# Patient Record
Sex: FEMALE | Race: WHITE | NOT HISPANIC OR LATINO | Employment: UNEMPLOYED | URBAN - METROPOLITAN AREA
[De-identification: names, ages, dates, MRNs, and addresses within clinical notes are randomized per-mention and may not be internally consistent; named-entity substitution may affect disease eponyms.]

---

## 2018-08-13 ENCOUNTER — TELEPHONE (OUTPATIENT)
Dept: ORTHOPEDIC SURGERY | Facility: CLINIC | Age: 83
End: 2018-08-13

## 2018-08-13 ENCOUNTER — OFFICE VISIT (OUTPATIENT)
Dept: ORTHOPEDIC SURGERY | Facility: CLINIC | Age: 83
End: 2018-08-13

## 2018-08-13 VITALS — TEMPERATURE: 98.9 F | HEIGHT: 61 IN | WEIGHT: 126 LBS | BODY MASS INDEX: 23.79 KG/M2

## 2018-08-13 DIAGNOSIS — S93.401A SPRAIN OF RIGHT ANKLE, UNSPECIFIED LIGAMENT, INITIAL ENCOUNTER: ICD-10-CM

## 2018-08-13 DIAGNOSIS — S99.911A ANKLE INJURY, RIGHT, INITIAL ENCOUNTER: Primary | ICD-10-CM

## 2018-08-13 DIAGNOSIS — S99.912A ANKLE INJURY, LEFT, INITIAL ENCOUNTER: ICD-10-CM

## 2018-08-13 PROCEDURE — 73610 X-RAY EXAM OF ANKLE: CPT | Performed by: ORTHOPAEDIC SURGERY

## 2018-08-13 PROCEDURE — 99203 OFFICE O/P NEW LOW 30 MIN: CPT | Performed by: ORTHOPAEDIC SURGERY

## 2018-08-13 RX ORDER — ASCORBIC ACID 500 MG
500 TABLET ORAL DAILY
COMMUNITY

## 2018-08-13 RX ORDER — CELECOXIB 200 MG/1
200 CAPSULE ORAL DAILY
COMMUNITY

## 2018-08-14 NOTE — PROGRESS NOTES
"   New Patient Complaint      Patient: Rosalind Phillips  YOB: 1932 85 y.o. female  Medical Record Number: [id number]    Chief Complaints: I hurt my ankle    History of Present Illness:   Patient sustained an injury to her right ankle this morning when she tripped.  She initially had significant pain and swelling to the right ankle and wrapped it.  Her  who is retired physician called and I worked her in today.  She states it is feeling much better with only slight achiness over the anterolateral aspect of the right ankle with some swelling worse with walking and improved with ice and        HPI    Allergies:   Allergies   Allergen Reactions   • Penicillins Hives       Medications:   No current outpatient prescriptions on file prior to visit.     No current facility-administered medications on file prior to visit.        No past medical history on file.  No past surgical history on file.  Social History     Occupational History   • Not on file.     Social History Main Topics   • Smoking status: Never Smoker   • Smokeless tobacco: Never Used   • Alcohol use No   • Drug use: Unknown   • Sexual activity: Defer      Social History     Social History Narrative   • No narrative on file     No family history on file.    Review of Systems: 14 point review of systems performed, positive pertinent findings identified in HPI. All remaining systems negativeExcept rash     Review of Systems      Physical Exam:   Vitals:    08/13/18 1548   Temp: 98.9 °F (37.2 °C)   Weight: 57.2 kg (126 lb)   Height: 154.9 cm (61\")   PainSc:   5     Physical Exam   Constitutional: pleasant, well developed  she is with her granddaughter   Eyes: sclera non icteric  Hearing : adequate for exam  Cardiovascular: palpable pulses in right foot, right calf/ thigh NT without sign of DVT  Respiratoy: breathing unlabored   Neurological: grossly sensate to LT throughout right LE  Psychiatric: oriented with normal mood and affect. "   Lymphatic: No palpable popliteal lymphadenopathy right LE  Skin: intact throughout right leg/foot  Musculoskeletal: On exam she is in fashionable shoes.  Right ankle shows slight swelling over the anterolateral ligamentous structures with slight tenderness to palpation there but no gross instability on talar tilt and anterior drawer.  She was nontender along the fibula nor peroneal tendons.  No pain on the syndesmosis to palpation nor with proximal fibular compression or external rotation testing.  Nontender over the medial ankle and nontender over the dorsum of the midfoot or fifth metatarsal.  Physical Exam  Ortho Exam    Radiology: 3 views of the right ankle ordered to evaluate pain reviewed and no prior x-rays available for comparison.  I don't see any obvious fracture or malalignment.    Assessment/Plan: 1.  Right ankle sprain without clear evidence of fracture or tendon injury.    Overall she is much better than she was earlier today.  She was fitted with an ASO brace for use for the next 2 weeks and if symptoms have improved she may start weaning out of it.  Also gave her prescription for Voltaren gel to apply the area twice daily.  She's already on Celebrex.    If symptoms persist or worsen she will let me know and we'll get an MRI otherwise I will see her back as needed

## 2021-08-13 ENCOUNTER — HOSPITAL ENCOUNTER (OUTPATIENT)
Dept: GENERAL RADIOLOGY | Facility: HOSPITAL | Age: 86
Discharge: HOME | End: 2021-08-13
Admitting: SURGERY

## 2021-08-13 ENCOUNTER — TRANSCRIBE ORDERS (OUTPATIENT)
Dept: ADMINISTRATIVE | Facility: HOSPITAL | Age: 86
End: 2021-08-13
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
## 2021-08-13 PROCEDURE — 73060 X-RAY EXAM OF HUMERUS: CPT

## 2021-10-27 ENCOUNTER — OFFICE VISIT (OUTPATIENT)
Dept: FAMILY MEDICINE CLINIC | Facility: CLINIC | Age: 86
End: 2021-10-27

## 2021-10-27 VITALS
BODY MASS INDEX: 23.03 KG/M2 | DIASTOLIC BLOOD PRESSURE: 70 MMHG | HEART RATE: 78 BPM | OXYGEN SATURATION: 97 % | SYSTOLIC BLOOD PRESSURE: 124 MMHG | RESPIRATION RATE: 15 BRPM | TEMPERATURE: 96.9 F | WEIGHT: 122 LBS | HEIGHT: 61 IN

## 2021-10-27 DIAGNOSIS — L65.0 TELOGEN EFFLUVIUM: Primary | ICD-10-CM

## 2021-10-27 DIAGNOSIS — G47.62 LEG CRAMPS, SLEEP RELATED: ICD-10-CM

## 2021-10-27 PROCEDURE — 99203 OFFICE O/P NEW LOW 30 MIN: CPT | Performed by: NURSE PRACTITIONER

## 2021-10-27 PROCEDURE — G0008 ADMIN INFLUENZA VIRUS VAC: HCPCS | Performed by: NURSE PRACTITIONER

## 2021-10-27 PROCEDURE — 90662 IIV NO PRSV INCREASED AG IM: CPT | Performed by: NURSE PRACTITIONER

## 2021-10-27 RX ORDER — FOLIC ACID 0.8 MG
TABLET ORAL
COMMUNITY

## 2021-10-27 RX ORDER — SOFT LENS ADJUNCTIVE SOLUTIONS
1 DROPS OPHTHALMIC (EYE)
COMMUNITY

## 2021-10-27 RX ORDER — ZINC SULFATE 50(220)MG
CAPSULE ORAL
COMMUNITY

## 2021-10-27 RX ORDER — LUTEIN 10 MG
TABLET ORAL
COMMUNITY

## 2021-10-27 RX ORDER — PRASTERONE (DHEA) 50 MG
CAPSULE ORAL
COMMUNITY

## 2021-10-27 RX ORDER — MULTIPLE VITAMINS W/ MINERALS TAB 9MG-400MCG
1 TAB ORAL DAILY
COMMUNITY

## 2021-10-27 NOTE — PROGRESS NOTES
"Chief Complaint  Medicare Wellness-subsequent    Subjective          Rosalind Phillips presents to [facility]  History of Present Illness New pt here to [facility] care.  Previous PCP no longer in practice.      Considers herself overall very healthy.  She has had some falls and begun balance exercises and paying more attn and has not had a fall since last in August when she hurt her left shoulder, but no fx.  Has been doing PT and improved.  Takes vitamin D daily for osteopenia.  On celebrex daily for OA and feels like it works well.  Denies side effects.     Hair loss x several months which now seems to be decreasing. This was not normal for her and she noticed thinning of her hair around crown of head.   also noticed and since then she has been taking more supplements for hair loss in addition to biotin she takes.      She also has leg cramps when sleeping off and on for last several years.  They wake her up at night and she has to stand on floor to resolve.  Never been evaluated.  Does not seem to have restless legs at night and has not had cramping in her legs during the day.    She avoids any acidic foods as it aggravates her reflux.  Takes occasional Pepcid to resolve.    Denies other health concerns today.  Objective   Vital Signs:   /70 (BP Location: Right arm, Patient Position: Sitting, Cuff Size: Adult)   Pulse 78   Temp 96.9 °F (36.1 °C) (Temporal)   Resp 15   Ht 154.9 cm (60.98\")   Wt 55.3 kg (122 lb)   SpO2 97%   BMI 23.06 kg/m²     Physical Exam  Vitals and nursing note reviewed.   Constitutional:       General: She is not in acute distress.     Appearance: She is well-developed. She is not ill-appearing or diaphoretic.   HENT:      Head: Normocephalic and atraumatic.   Eyes:      General:         Right eye: No discharge.         Left eye: No discharge.      Conjunctiva/sclera: Conjunctivae normal.   Cardiovascular:      Rate and Rhythm: Normal rate and " regular rhythm.      Heart sounds: Normal heart sounds.   Pulmonary:      Effort: Pulmonary effort is normal.      Breath sounds: Normal breath sounds.   Abdominal:      General: Bowel sounds are normal.      Palpations: Abdomen is soft.      Tenderness: There is no abdominal tenderness.   Musculoskeletal:         General: No deformity.      Comments: Gait smooth and steady   Skin:     General: Skin is warm and dry.      Comments: Patient does appear to have some mild hair loss.  No patchy alopecia, no scarring.  Loss seems to be mostly crown of head.   Neurological:      Mental Status: She is alert and oriented to person, place, and time.        Result Review :                 Assessment and Plan    Diagnoses and all orders for this visit:    1. Telogen effluvium (Primary)  -     Iron Profile; Future  -     Ferritin; Future  -     TSH Rfx On Abnormal To Free T4; Future    2. Leg cramps, sleep related  -     Iron Profile; Future  -     Ferritin; Future    Other orders  -     Cancel: FluLaval/Fluarix/Fluzone >6 Months (6364-3321)  -     Fluzone High-Dose 65+yrs (5227-4188)      Pt will need to be off biotin for at least 72 hrs to get appropriate TSH level.  She will return for labs.      Discussed mgmt of leg cramps:  Will check ferritin and iron to make sure not RLS.      Reviewed last PCP note in June-wellness visit, most recent labs and imaging from recent fall.            Follow Up   Return if symptoms worsen or fail to improve.  Patient was given instructions and counseling regarding her condition or for health maintenance advice. Please see specific information pulled into the AVS if appropriate.

## 2021-11-05 ENCOUNTER — TELEPHONE (OUTPATIENT)
Dept: FAMILY MEDICINE CLINIC | Facility: CLINIC | Age: 86
End: 2021-11-05

## 2021-11-05 NOTE — TELEPHONE ENCOUNTER
Caller: Rosalind Phillips    Relationship: Self    Best call back number:[phone] (H)     What is the best time to reach you: ANYTIME    Who are you requesting to speak with (clinical staff, provider,  specific staff member): CLINICAL STAFF    What was the call regarding: PATIENT RETURNING MARIANN'S CALL REGARDING TEST RESULTS    Do you require a callback: YES

## 2022-08-25 ENCOUNTER — OFFICE VISIT (OUTPATIENT)
Dept: ORTHOPEDIC SURGERY | Facility: CLINIC | Age: 87
End: 2022-08-25

## 2022-08-25 VITALS — HEIGHT: 60 IN | BODY MASS INDEX: 23.56 KG/M2 | TEMPERATURE: 97.3 F | WEIGHT: 120 LBS | RESPIRATION RATE: 18 BRPM

## 2022-08-25 DIAGNOSIS — M25.561 PAIN IN BOTH KNEES, UNSPECIFIED CHRONICITY: ICD-10-CM

## 2022-08-25 DIAGNOSIS — M17.0 BILATERAL PRIMARY OSTEOARTHRITIS OF KNEE: Primary | ICD-10-CM

## 2022-08-25 DIAGNOSIS — R52 PAIN: ICD-10-CM

## 2022-08-25 DIAGNOSIS — M25.562 PAIN IN BOTH KNEES, UNSPECIFIED CHRONICITY: ICD-10-CM

## 2022-08-25 PROCEDURE — 99204 OFFICE O/P NEW MOD 45 MIN: CPT | Performed by: ORTHOPAEDIC SURGERY

## 2022-08-25 PROCEDURE — 73562 X-RAY EXAM OF KNEE 3: CPT | Performed by: ORTHOPAEDIC SURGERY

## 2022-08-25 NOTE — PROGRESS NOTES
"Patient ID: Rosalind Phillips     Chief Complaint:    Chief Complaint   Patient presents with   • Left Knee - Pain, Initial Evaluation   • Right Knee - Pain, Initial Evaluation        HPI:    Rosalind Phillips is a 89 y.o. who presents today for evaluation of bilateral knee pain.  Patient states she has had knee issues for several years currently left is worse than the right.  The increased activity or certain movements she has increased symptoms.  She does take Celebrex and states that it has helped keep symptoms at bay.  She is also feeling some left knee instability at times.  She states she exercises every day for some knee strengthening.  But would like to discuss other treatment options specifically an injection for her symptoms.    Social History     Socioeconomic History   • Marital status:    Tobacco Use   • Smoking status: Never Smoker   • Smokeless tobacco: Never Used   Vaping Use   • Vaping Use: Never used   Substance and Sexual Activity   • Alcohol use: No   • Sexual activity: Defer     History reviewed. No pertinent past medical history.  History reviewed. No pertinent family history.    ROS:    ROS:  Constitutional:  Denies fever, shaking or chills         All other pertinent positives and negatives as noted above in HPI.    Physical Exam:     Vital Signs:  Temp 97.3 °F (36.3 °C)   Resp 18   Ht 152.4 cm (60\")   Wt 54.4 kg (120 lb)   BMI 23.44 kg/m²   Constitutional: Awake alert and oriented x3, well developed, well nourished, no acute distress, non-toxic appearance.      Musculoskeletal:    Exam of the bilateral  knee:    No muscle atrophy, erythema, ecchymosis, or gross deformity noted  no knee effusion  No joint line tenderness  Active range of motion 0-125  5/5 strength flexion and extension  The knee is stable to varus and valgus stress testing  Mild valgus alignment of the limb  Lachman negative  Posterior drawer negative  Kimani's negative  Patellofemoral grind +  Sensation grossly " intact to light tough throughout the lower extremity  Skin is intact  Distal pulses are 2+  No signs or symptoms of DVT      Bilateral Hip exam:  No atrophy, erythema, ecchymosis, or gross deformity noted  No tenderness to palpation  Slightly dimished active range of motion, mild lack of IR but nonpainful  5/5 strength in hip flexion, abduction, and adduction  Anterior, lateral, and posterior hip impingement tests negative  Stinchfield and straight leg raise negative  ROSEY negative        Diagnostic Studies:     Imaging was personally and individually reviewed and discussed at length with the patient:    4V bilateral knee(s) were taken in the office today, including AP, flexion PA, lateral, and sunrise views to evaluate the patient's complaint:  Weight bearing views show moderate degenerative changes in all three compartments with the lateral compartment being most affected and is bone-on-bone.  There is early osteophyte formation throughout all three compartments.  There is no evidence of fracture or dislocation.  No periosteal reactions or medullary lesions are seen.  Patellar height and alignment are within normal limits.     Comparison films not available    AP pelvis was taken in the office today: Demonstrates moderate degenerative changes bilateral hip joints with some bone sclerosis and early osteophyte formation.  No acute fractures noted.            Assessment:     bilateral  Knee Osteoarthritis            Plan:     Based on x-rays, history, and office evaluation, we have diagnosed Rosalind Phillips with knee arthritis. At this time, we recommend starting with a conservative treatment program. This will consist of cortisone injection during significant flare-ups, NSAIDS for daily maintenance, physical therapy for strengthening and modalities as indicated, and bracing when appropriate. These measures will continue, until symptoms are no longer relieved, become more severe or function begins to significantly  deteriorate. At that point joint replacement options will be discussed.    At this time she will continue her Celebrex, home therapy exercises and we will request approval for gel injections for the right and left knee.  Once approval is obtained we can plan to bring her back in administer injections.        All questions were answered, the patient understands and agrees with the plan.

## 2022-10-17 ENCOUNTER — CLINICAL SUPPORT (OUTPATIENT)
Dept: ORTHOPEDIC SURGERY | Facility: CLINIC | Age: 87
End: 2022-10-17

## 2022-10-17 VITALS — HEIGHT: 60 IN | WEIGHT: 120 LBS | BODY MASS INDEX: 23.56 KG/M2 | TEMPERATURE: 97.5 F

## 2022-10-17 DIAGNOSIS — M17.0 BILATERAL PRIMARY OSTEOARTHRITIS OF KNEE: Primary | ICD-10-CM

## 2022-10-17 PROCEDURE — 20610 DRAIN/INJ JOINT/BURSA W/O US: CPT | Performed by: ORTHOPAEDIC SURGERY

## 2022-10-17 NOTE — PROGRESS NOTES
"Knee Joint Injection      Patient: Rosalind Phillips        YOB: 1932            Chief Complaints: Knee pain      History of Present Illness: Is here for gel injections for both knees.  Understands options.      Physical Exam: 89 y.o. female  General Appearance:    Alert, cooperative, in no acute distress                   Vitals:    10/17/22 1601   Temp: 97.5 °F (36.4 °C)   TempSrc: Temporal   Weight: 54.4 kg (120 lb)   Height: 152.4 cm (60\")      Patient is alert and read ×3 no acute distress appears her above-listed at height weight and age.  Affect is normal respiratory rate is normal unlabored. Heart rate regular rate rhythm, sclera, dentition and hearing are normal for the purpose of this exam.  Exam and complaints are unchanged.      Procedure:  Bilateral gel knee injections.          Assessment. Persistent knee pain      Plan: Is to proceed with injection    Patient tolerated injections well.        Large Joint Arthrocentesis: R knee  Date/Time: 10/17/2022 4:04 PM  Consent given by: patient  Site marked: site marked  Timeout: Immediately prior to procedure a time out was called to verify the correct patient, procedure, equipment, support staff and site/side marked as required   Supporting Documentation  Indications: pain   Procedure Details  Location: knee - R knee  Preparation: Patient was prepped and draped in the usual sterile fashion  Needle gauge: 21.  Approach: anterolateral  Medications administered: 88 mg Hyaluronan 88 MG/4ML  Patient tolerance: patient tolerated the procedure well with no immediate complications    Large Joint Arthrocentesis: L knee  Date/Time: 10/17/2022 4:05 PM  Consent given by: patient  Site marked: site marked  Timeout: Immediately prior to procedure a time out was called to verify the correct patient, procedure, equipment, support staff and site/side marked as required   Supporting Documentation  Indications: pain   Procedure Details  Location: knee - L " knee  Preparation: Patient was prepped and draped in the usual sterile fashion  Needle gauge: 21.  Approach: anterolateral  Medications administered: 88 mg Hyaluronan 88 MG/4ML  Patient tolerance: patient tolerated the procedure well with no immediate complications

## 2022-12-13 ENCOUNTER — TELEPHONE (OUTPATIENT)
Dept: FAMILY MEDICINE CLINIC | Facility: CLINIC | Age: 87
End: 2022-12-13

## 2022-12-14 ENCOUNTER — OFFICE VISIT (OUTPATIENT)
Dept: FAMILY MEDICINE CLINIC | Facility: CLINIC | Age: 87
End: 2022-12-14

## 2022-12-14 VITALS
BODY MASS INDEX: 24.35 KG/M2 | SYSTOLIC BLOOD PRESSURE: 132 MMHG | WEIGHT: 124 LBS | HEART RATE: 70 BPM | HEIGHT: 60 IN | OXYGEN SATURATION: 97 % | DIASTOLIC BLOOD PRESSURE: 75 MMHG | TEMPERATURE: 97.8 F

## 2022-12-14 DIAGNOSIS — M62.830 MUSCLE SPASM OF BACK: Primary | ICD-10-CM

## 2022-12-14 PROCEDURE — 99213 OFFICE O/P EST LOW 20 MIN: CPT | Performed by: NURSE PRACTITIONER

## 2022-12-14 RX ORDER — CYCLOBENZAPRINE HCL 5 MG
5 TABLET ORAL NIGHTLY PRN
Qty: 30 TABLET | Refills: 0 | Status: SHIPPED | OUTPATIENT
Start: 2022-12-14

## 2022-12-14 NOTE — PROGRESS NOTES
"Chief Complaint  Back Pain (C/o R side back pain, since Sunday )    Subjective        Rosalind Phillips presents to [facility]  History of Present Illness pt here with daughter for sudden onset of right upper back pain that began within a few hrs of yoga stretches-she has back pain chronically, but not had pain in her neck.  She went to  due to pain on 12-12.  She has a negative XR, but was not sure if she needs further imaging.  Taking and tolerating celebrex chronically for LBP as well as many supplements.    She denies radiculopathy, fever, chills,  No injuries.        Objective   Vital Signs:  /75   Pulse 70   Temp 97.8 °F (36.6 °C)   Ht 152.4 cm (60\")   Wt 56.2 kg (124 lb)   SpO2 97%   BMI 24.22 kg/m²   Estimated body mass index is 24.22 kg/m² as calculated from the following:    Height as of this encounter: 152.4 cm (60\").    Weight as of this encounter: 56.2 kg (124 lb).    BMI is within normal parameters. No other follow-up for BMI required.      Physical Exam  Vitals and nursing note reviewed.   Constitutional:       General: She is not in acute distress.     Appearance: She is well-developed. She is not ill-appearing or diaphoretic.   HENT:      Head: Normocephalic and atraumatic.   Eyes:      General:         Right eye: No discharge.         Left eye: No discharge.      Conjunctiva/sclera: Conjunctivae normal.   Cardiovascular:      Rate and Rhythm: Normal rate and regular rhythm.      Heart sounds: Normal heart sounds.   Pulmonary:      Effort: Pulmonary effort is normal.      Breath sounds: Normal breath sounds.   Abdominal:      General: Bowel sounds are normal.      Palpations: Abdomen is soft.      Tenderness: There is no abdominal tenderness.   Musculoskeletal:      Cervical back: No deformity or bony tenderness.      Thoracic back: Spasms and tenderness present. No deformity or bony tenderness. Decreased range of motion.      Comments: Muscle spasms, TTP " right Thoracic region medial right scapular border.     Skin:     General: Skin is warm and dry.   Neurological:      Mental Status: She is alert and oriented to person, place, and time.        Result Review :                Assessment and Plan   Diagnoses and all orders for this visit:    1. Muscle spasm of back (Primary)  -     cyclobenzaprine (FLEXERIL) 5 MG tablet; Take 1 tablet by mouth At Night As Needed for Muscle Spasms.  Dispense: 30 tablet; Refill: 0    We discussed etiology pain and mgmt, stretching needed with hydration.  She has previously taken muscle relaxers and would like Rx.  We discussed sie effects and cautions.  Daughter will make sure pt monitored at home for excess drowsiness, fall risk as we discussed.  RTC if not improving as expected, certainly worsening.  UC note and negative XR for acute findings discussed.         Follow Up   Return if symptoms worsen or fail to improve.  Patient was given instructions and counseling regarding her condition or for health maintenance advice. Please see specific information pulled into the AVS if appropriate.

## 2023-04-17 ENCOUNTER — OFFICE VISIT (OUTPATIENT)
Dept: ORTHOPEDIC SURGERY | Facility: CLINIC | Age: 88
End: 2023-04-17
Payer: MEDICARE

## 2023-04-17 VITALS — BODY MASS INDEX: 24.15 KG/M2 | HEIGHT: 60 IN | WEIGHT: 123 LBS | TEMPERATURE: 95.5 F

## 2023-04-17 DIAGNOSIS — M17.0 BILATERAL PRIMARY OSTEOARTHRITIS OF KNEE: Primary | ICD-10-CM

## 2023-04-17 NOTE — PROGRESS NOTES
"Knee Joint Injection      Patient: Rosalind Phillips        YOB: 1932            Chief Complaints: Knee pain      History of Present Illness:  Pt gets intermittent  injections with good relief. Is here for gel injections understands options.  Patient states injections from about 6 months ago helped quite a bit.      Physical Exam: 89 y.o. female  General Appearance:    Alert, cooperative, in no acute distress                   Vitals:    04/17/23 1504   Temp: 95.5 °F (35.3 °C)   Weight: 55.8 kg (123 lb)   Height: 152.4 cm (60\")   PainSc:   4   PainLoc: Knee      Patient is alert and read ×3 no acute distress appears her above-listed at height weight and age.  Affect is normal respiratory rate is normal unlabored. Heart rate regular rate rhythm, sclera, dentition and hearing are normal for the purpose of this exam.  Exam and complaints are unchanged.      Procedure:  Lateral knee gel injections.          Assessment. Persistent knee pain      Plan: Is to proceed with injection    Any conservative treatment.    She tolerated injections well.          Large Joint Arthrocentesis: L knee  Date/Time: 4/17/2023 3:21 PM  Consent given by: patient  Site marked: site marked  Timeout: Immediately prior to procedure a time out was called to verify the correct patient, procedure, equipment, support staff and site/side marked as required   Supporting Documentation  Indications: pain   Procedure Details  Location: knee - L knee  Preparation: Patient was prepped and draped in the usual sterile fashion  Needle gauge: 21 G.  Approach: anterolateral  Medications administered: 8 mg/mL Hylan 16 MG/2ML  Patient tolerance: patient tolerated the procedure well with no immediate complications    Large Joint Arthrocentesis: R knee  Date/Time: 4/17/2023 3:23 PM  Consent given by: patient  Site marked: site marked  Timeout: Immediately prior to procedure a time out was called to verify the correct patient, procedure, equipment, " support staff and site/side marked as required   Procedure Details  Location: knee - R knee  Preparation: Patient was prepped and draped in the usual sterile fashion  Needle gauge: 21 G.  Approach: anterolateral  Medications administered: 8 mg/mL Hylan 16 MG/2ML  Patient tolerance: patient tolerated the procedure well with no immediate complications

## 2023-05-24 ENCOUNTER — TRANSCRIBE ORDERS (OUTPATIENT)
Dept: ADMINISTRATIVE | Facility: HOSPITAL | Age: 88
End: 2023-05-24
Payer: MEDICARE

## 2023-05-24 DIAGNOSIS — Z78.0 ASYMPTOMATIC MENOPAUSAL STATE: Primary | ICD-10-CM

## 2023-10-19 ENCOUNTER — CLINICAL SUPPORT (OUTPATIENT)
Dept: ORTHOPEDIC SURGERY | Facility: CLINIC | Age: 88
End: 2023-10-19
Payer: MEDICARE

## 2023-10-19 VITALS — HEIGHT: 60 IN | BODY MASS INDEX: 23.46 KG/M2 | WEIGHT: 119.5 LBS | TEMPERATURE: 98.5 F

## 2023-10-19 DIAGNOSIS — G89.29 CHRONIC LOW BACK PAIN, UNSPECIFIED BACK PAIN LATERALITY, UNSPECIFIED WHETHER SCIATICA PRESENT: Primary | ICD-10-CM

## 2023-10-19 DIAGNOSIS — M17.0 BILATERAL PRIMARY OSTEOARTHRITIS OF KNEE: Primary | ICD-10-CM

## 2023-10-19 DIAGNOSIS — M54.50 CHRONIC LOW BACK PAIN, UNSPECIFIED BACK PAIN LATERALITY, UNSPECIFIED WHETHER SCIATICA PRESENT: Primary | ICD-10-CM

## 2023-10-19 NOTE — PROGRESS NOTES
"Knee Joint Injection      Patient: Rosalind Phillips        YOB: 1932            Chief Complaints: Knee pain      History of Present Illness:  Pt gets intermittent  injections with good relief. Is here for bilateral knee gel injections understands options.      Physical Exam: 89 y.o. female  General Appearance:    Alert, cooperative, in no acute distress                   Vitals:    10/19/23 1008   Temp: 98.5 °F (36.9 °C)   TempSrc: Temporal   Weight: 54.2 kg (119 lb 8 oz)   Height: 152.4 cm (60\")   PainSc:   6   PainLoc: Knee  Comment: left and right      Patient is alert and read ×3 no acute distress appears her above-listed at height weight and age.  Affect is normal respiratory rate is normal unlabored. Heart rate regular rate rhythm, sclera, dentition and hearing are normal for the purpose of this exam.  Exam and complaints are unchanged.      Procedure:  Bilateral knee gel injections          Assessment. Persistent knee pain      Plan: Is to proceed with injection        ..Large Joint Arthrocentesis: R knee  Date/Time: 10/19/2023 9:34 AM  Consent given by: patient  Site marked: site marked  Timeout: Immediately prior to procedure a time out was called to verify the correct patient, procedure, equipment, support staff and site/side marked as required   Supporting Documentation  Indications: pain   Procedure Details  Location: knee - R knee  Preparation: Patient was prepped and draped in the usual sterile fashion  Needle gauge: 21g.  Approach: lateral  Medications administered: 22 mg Hyaluronan 88 MG/4ML  Patient tolerance: patient tolerated the procedure well with no immediate complications      Large Joint Arthrocentesis: L knee  Date/Time: 10/19/2023 9:36 AM  Consent given by: patient  Site marked: site marked  Timeout: Immediately prior to procedure a time out was called to verify the correct patient, procedure, equipment, support staff and site/side marked as required   Supporting " Documentation  Indications: pain   Procedure Details  Location: knee - L knee  Preparation: Patient was prepped and draped in the usual sterile fashion  Needle gauge: 21g.  Approach: lateral  Medications administered: 22 mg Hyaluronan 88 MG/4ML  Patient tolerance: patient tolerated the procedure well with no immediate complications

## 2023-10-27 ENCOUNTER — OFFICE VISIT (OUTPATIENT)
Dept: ORTHOPEDIC SURGERY | Facility: CLINIC | Age: 88
End: 2023-10-27
Payer: MEDICARE

## 2023-10-27 VITALS — BODY MASS INDEX: 23.56 KG/M2 | HEIGHT: 60 IN | TEMPERATURE: 96.8 F | WEIGHT: 120 LBS

## 2023-10-27 DIAGNOSIS — M54.50 ACUTE RIGHT-SIDED LOW BACK PAIN WITHOUT SCIATICA: ICD-10-CM

## 2023-10-27 DIAGNOSIS — R52 PAIN: ICD-10-CM

## 2023-10-27 DIAGNOSIS — M51.36 DDD (DEGENERATIVE DISC DISEASE), LUMBAR: Primary | ICD-10-CM

## 2023-10-27 PROCEDURE — 99213 OFFICE O/P EST LOW 20 MIN: CPT | Performed by: NURSE PRACTITIONER

## 2023-10-27 NOTE — PROGRESS NOTES
Patient Name: Rosalind Phillips   YOB: 1932  Referring Primary Care Physician: Erasto Abarca MD      Chief Complaint:    Chief Complaint   Patient presents with    Lumbar Spine - Initial Evaluation, Pain        LAVERNE leg cramps at nighttime only       Back Pain  This is a new problem. The current episode started more than 1 month ago. The problem occurs constantly. The problem has been gradually worsening since onset. The pain is present in the lumbar spine. The quality of the pain is described as aching and cramping. The pain does not radiate. The pain is at a severity of 6/10. The pain is moderate. The pain is Worse during the night. Stiffness is present In the morning. Pertinent negatives include no abdominal pain, bladder incontinence, bowel incontinence, leg pain, numbness, tingling or weakness. Treatments tried: celebrex. The treatment provided moderate relief.        HPI:  Rosalind Phillips is a 89 y.o. female who presents to [facility] for evaluation of above complaints, essentially bilateral low back pain. This is an established patient of practice referred by Dr Porras.  She believes the symptoms started when she was having to help her  who was fairly immobile from pancreatic cancer.  He has since passed.  She is very active, but struggling with the persistence of back pain.  Prior pertinent records were reviewed.    PFSH:  See attached    ROS: As per HPI, otherwise negative    Objective:      89 y.o. female  Body mass index is 23.44 kg/m²., 54.4 kg (120 lb), @HT@  Vitals:    10/27/23 1002   Temp: 96.8 °F (36 °C)     Pain Score    10/27/23 1002   PainSc:   6   PainLoc: Back            Spine Musculoskeletal Exam    Gait    Gait is normal.    Inspection    Coronal balance: no imbalance    Sagittal balance: no imbalance    Palpation    Thoracolumbar    Tenderness: none    Strength    Thoracolumbar    Thoracolumbar motor exam is normal.       Sensory     Thoracolumbar    Thoracolumbar sensation is normal.    Reflexes    Right      Quadriceps: 1/4      Achilles: hyporeflexic     Left      Quadriceps: 1/4      Achilles: hyporeflexic    Special Tests    Thoracolumbar      Right      SLR: no back or leg pain      Left      SLR: no back or leg pain        IMAGING:     Indication: pain related symptoms,  Views: 2V AP&LAT lumbar  Findings: Reviewed and reveals multilevel degenerative disc and facet changes, grade 1 anterolisthesis L4 on L5, grade 1 anterolisthesis of L4 3 in respect L2 and L4.  No obvious fractures.  Comparison views: None    Assessment:           Diagnoses and all orders for this visit:    1. DDD (degenerative disc disease), lumbar (Primary)  -     Ambulatory Referral to Physical Therapy Evaluate and treat; Heat, Electrotherapy; Ultrasound; Soft Tissue Mobilizaton; Stretching, Strengthening    2. Pain  -     XR Spine Lumbar AP & Lateral    3. Acute right-sided low back pain without sciatica  -     Ambulatory Referral to Physical Therapy Evaluate and treat; Heat, Electrotherapy; Ultrasound; Soft Tissue Mobilizaton; Stretching, Strengthening             Plan:  For her pattern of right greater than left low back pain, will refer her to physical therapy.  May be SI joint mediated pain.  She is utilizing Celebrex which helps significantly.  Also advised her to try heat and/or ice to the painful area.  We also discussed lidocaine patches or creams.  She was advised not to utilize these with heat as they could create a skin burn, however could alternate to see which treatment helps for the most.  I do not believe we will need to get any more aggressive as she does not have any radiculopathy or loss of nerve function on exam.  If therapy does not give her satisfactory relief, could consider MRI with an eye toward injection therapy.  Follow-up as needed.      Return if symptoms worsen or fail to improve.    EMR Dragon/Transcription Disclaimer:   Much of this  encounter note is an electronic transcription/translation of spoken language to printed text. The electronic translation of spoken language may permit erroneous, or at times, nonsensical words or phrases to be inadvertently transcribed; Although I have reviewed the note for such errors, some may still exist.  Red flags have been discussed at this or previous visits to include but not limited weakness in extremities, worsening pain that does not respond to conservative treatment and bowel or bladder dysfunction. These are reasons to present to ER and patient has been informed.    The diagnosis(es), natural history, pathophysiology and treatment for diagnosis(es) were discussed. Opportunity given and questions answered. Biomechanics of pertinent body areas discussed.    EXERCISES:  Advice on benefits of, and types of regular/moderate exercise pertaining to diagnosis.  Continue HEP. For back or neck pain, recommend pilates and or yoga as tolerated. Generally it is best to start any new exercise under the guidance of a  or therapist.   MEDICATIONS:  When prescribe, the risks, benefits, warnings,side effects and alternatives of medications discussed. Advised against long term use of narcotics.   PAIN CONTROL:  Cold, heat, OTC lidocaine patches and/or ointment as needed. Avoid direct skin contact with ice. Ice 15-20 minutes 3-4 times daily as needed. For SI joint pain, recommend ice bath in water about 50 degrees for 5 consecutive days, add ice slowly to help with adjustment and may cover with warm towel or robe to help with cold tolerance. If using lidocaine, do not apply heat in conjunction as this can cause a burn.   MEDICAL RECORDS reviewed from other provider(s) for past and current medical history pertinent to this visit..

## 2023-11-13 ENCOUNTER — TELEPHONE (OUTPATIENT)
Dept: ORTHOPEDIC SURGERY | Facility: CLINIC | Age: 88
End: 2023-11-13

## 2023-11-13 NOTE — TELEPHONE ENCOUNTER
Caller: Rosalind Phillips    Relationship to patient: Self    Best call back number: [phone] (home)     Patient is needing: PATIENT STATED THAT [facility] [place] DID NOT RECEIVED HER REFERRAL. PLEASE SEND IT AGAIN.

## 2023-12-14 ENCOUNTER — HOSPITAL ENCOUNTER (OUTPATIENT)
Dept: BONE DENSITY | Facility: HOSPITAL | Age: 88
Discharge: HOME | End: 2023-12-14
Admitting: INTERNAL MEDICINE
Payer: MEDICARE

## 2023-12-14 DIAGNOSIS — Z78.0 ASYMPTOMATIC MENOPAUSAL STATE: ICD-10-CM

## 2023-12-14 PROCEDURE — 77080 DXA BONE DENSITY AXIAL: CPT

## 2024-01-04 ENCOUNTER — TRANSCRIBE ORDERS (OUTPATIENT)
Dept: ADMINISTRATIVE | Facility: HOSPITAL | Age: 89
End: 2024-01-04
Payer: MEDICARE

## 2024-01-04 DIAGNOSIS — Z78.0 OSTEOPENIA AFTER MENOPAUSE: Primary | ICD-10-CM

## 2024-01-04 DIAGNOSIS — M85.80 OSTEOPENIA AFTER MENOPAUSE: Primary | ICD-10-CM

## 2024-01-08 PROBLEM — M19.90 OSTEOARTHRITIS: Status: ACTIVE | Noted: 2024-01-08

## 2024-01-08 PROBLEM — Q78.2 OSTEOPETROSIS: Status: ACTIVE | Noted: 2024-01-08

## 2024-01-08 RX ORDER — ZOLEDRONIC ACID 5 MG/100ML
5 INJECTION, SOLUTION INTRAVENOUS ONCE
OUTPATIENT
Start: 2024-01-11 | End: 2024-01-11

## 2024-01-11 ENCOUNTER — HOSPITAL ENCOUNTER (OUTPATIENT)
Dept: INFUSION THERAPY | Facility: HOSPITAL | Age: 89
Discharge: HOME | End: 2024-01-11
Payer: MEDICARE

## 2024-05-29 ENCOUNTER — TRANSCRIBE ORDERS (OUTPATIENT)
Dept: ADMINISTRATIVE | Facility: HOSPITAL | Age: 89
End: 2024-05-29
Payer: MEDICARE

## 2024-05-29 DIAGNOSIS — M54.16 LUMBAR RADICULOPATHY: Primary | ICD-10-CM

## 2025-04-15 ENCOUNTER — HOSPITAL ENCOUNTER (OUTPATIENT)
Dept: GENERAL RADIOLOGY | Facility: HOSPITAL | Age: OVER 89
Discharge: HOME | End: 2025-04-15
Admitting: INTERNAL MEDICINE
Payer: MEDICARE

## 2025-04-15 DIAGNOSIS — M79.604 PAIN OF RIGHT LEG: ICD-10-CM

## 2025-04-15 PROCEDURE — 73560 X-RAY EXAM OF KNEE 1 OR 2: CPT

## 2025-04-24 ENCOUNTER — HOSPITAL ENCOUNTER (OUTPATIENT)
Dept: MRI IMAGING | Facility: HOSPITAL | Age: OVER 89
Discharge: HOME | End: 2025-04-24
Admitting: INTERNAL MEDICINE
Payer: MEDICARE

## 2025-04-24 DIAGNOSIS — M54.16 LUMBAR RADICULOPATHY: ICD-10-CM

## 2025-04-24 PROCEDURE — 72148 MRI LUMBAR SPINE W/O DYE: CPT
